# Patient Record
Sex: FEMALE | Race: WHITE | Employment: FULL TIME | URBAN - METROPOLITAN AREA
[De-identification: names, ages, dates, MRNs, and addresses within clinical notes are randomized per-mention and may not be internally consistent; named-entity substitution may affect disease eponyms.]

---

## 2018-04-03 ENCOUNTER — OFFICE VISIT (OUTPATIENT)
Dept: FAMILY MEDICINE CLINIC | Age: 28
End: 2018-04-03
Payer: COMMERCIAL

## 2018-04-03 VITALS
BODY MASS INDEX: 22.71 KG/M2 | WEIGHT: 133 LBS | RESPIRATION RATE: 12 BRPM | SYSTOLIC BLOOD PRESSURE: 90 MMHG | TEMPERATURE: 98.8 F | DIASTOLIC BLOOD PRESSURE: 58 MMHG | OXYGEN SATURATION: 99 % | HEIGHT: 64 IN | HEART RATE: 53 BPM

## 2018-04-03 DIAGNOSIS — J06.9 ACUTE URI: Primary | ICD-10-CM

## 2018-04-03 LAB
INFLUENZA A ANTIBODY: NEGATIVE
INFLUENZA B ANTIBODY: NEGATIVE
S PYO AG THROAT QL: NORMAL

## 2018-04-03 PROCEDURE — 99213 OFFICE O/P EST LOW 20 MIN: CPT | Performed by: PHYSICIAN ASSISTANT

## 2018-04-03 PROCEDURE — 87804 INFLUENZA ASSAY W/OPTIC: CPT | Performed by: PHYSICIAN ASSISTANT

## 2018-04-03 PROCEDURE — 87880 STREP A ASSAY W/OPTIC: CPT | Performed by: PHYSICIAN ASSISTANT

## 2018-04-03 RX ORDER — IBUPROFEN 800 MG/1
800 TABLET ORAL EVERY 6 HOURS PRN
Qty: 20 TABLET | Refills: 0 | Status: SHIPPED | OUTPATIENT
Start: 2018-04-03 | End: 2019-06-12 | Stop reason: ALTCHOICE

## 2018-04-03 RX ORDER — BROMPHENIRAMINE MALEATE, PSEUDOEPHEDRINE HYDROCHLORIDE, AND DEXTROMETHORPHAN HYDROBROMIDE 2; 30; 10 MG/5ML; MG/5ML; MG/5ML
5 SYRUP ORAL 4 TIMES DAILY PRN
Qty: 100 ML | Refills: 0 | Status: SHIPPED | OUTPATIENT
Start: 2018-04-03 | End: 2019-06-12 | Stop reason: ALTCHOICE

## 2018-04-03 RX ORDER — AMOXICILLIN AND CLAVULANATE POTASSIUM 875; 125 MG/1; MG/1
1 TABLET, FILM COATED ORAL 2 TIMES DAILY
Qty: 20 TABLET | Refills: 0 | Status: SHIPPED | OUTPATIENT
Start: 2018-04-03 | End: 2018-04-13

## 2018-04-23 ENCOUNTER — EMPLOYEE WELLNESS (OUTPATIENT)
Dept: INTERNAL MEDICINE CLINIC | Age: 28
End: 2018-04-23

## 2018-04-23 LAB
CHOLESTEROL, TOTAL: 145 MG/DL (ref 0–199)
GLUCOSE BLD-MCNC: 83 MG/DL (ref 74–107)
HDLC SERPL-MCNC: 64 MG/DL
LDL CHOLESTEROL CALCULATED: 69 MG/DL (ref 0–99)
TRIGL SERPL-MCNC: 60 MG/DL (ref 0–149)

## 2018-05-02 VITALS — BODY MASS INDEX: 21.97 KG/M2 | WEIGHT: 128 LBS

## 2018-05-21 ENCOUNTER — CLINICAL DOCUMENTATION (OUTPATIENT)
Dept: FAMILY MEDICINE CLINIC | Age: 28
End: 2018-05-21

## 2018-05-21 ENCOUNTER — NURSE TRIAGE (OUTPATIENT)
Dept: OTHER | Facility: CLINIC | Age: 28
End: 2018-05-21

## 2018-09-13 ENCOUNTER — HOSPITAL ENCOUNTER (OUTPATIENT)
Age: 28
Discharge: HOME | End: 2018-09-15
Payer: COMMERCIAL

## 2018-09-13 PROCEDURE — 87210 SMEAR WET MOUNT SALINE/INK: CPT

## 2018-09-13 PROCEDURE — 87591 N.GONORRHOEAE DNA AMP PROB: CPT

## 2018-09-13 PROCEDURE — 87147 CULTURE TYPE IMMUNOLOGIC: CPT

## 2018-09-13 PROCEDURE — 87491 CHLMYD TRACH DNA AMP PROBE: CPT

## 2018-09-13 PROCEDURE — 87070 CULTURE OTHR SPECIMN AEROBIC: CPT

## 2018-09-17 LAB
GENITAL CULTURE, ROUTINE: ABNORMAL
GENITAL CULTURE, ROUTINE: ABNORMAL
ORGANISM: ABNORMAL

## 2018-09-18 LAB
CHLAMYDIA TRACHOMATIS AMPLIFIED DET: NORMAL
CULTURE, TRICHOMONAS: NORMAL
N GONORRHOEAE AMPLIFIED DET: NORMAL

## 2019-01-25 ENCOUNTER — HOSPITAL ENCOUNTER (OUTPATIENT)
Age: 29
Discharge: HOME | End: 2019-01-25
Payer: COMMERCIAL

## 2019-01-25 LAB
ALBUMIN SERPL-MCNC: 4.4 G/DL (ref 3.5–5.2)
ALP BLD-CCNC: 41 U/L (ref 35–104)
ALT SERPL-CCNC: 11 U/L (ref 0–32)
ANION GAP SERPL CALCULATED.3IONS-SCNC: 8 MMOL/L (ref 7–16)
AST SERPL-CCNC: 15 U/L (ref 0–31)
BILIRUB SERPL-MCNC: 0.3 MG/DL (ref 0–1.2)
BUN BLDV-MCNC: 14 MG/DL (ref 6–20)
CALCIUM SERPL-MCNC: 9.1 MG/DL (ref 8.6–10.2)
CHLORIDE BLD-SCNC: 104 MMOL/L (ref 98–107)
CHOLESTEROL, TOTAL: 136 MG/DL (ref 0–199)
CO2: 27 MMOL/L (ref 22–29)
CREAT SERPL-MCNC: 0.9 MG/DL (ref 0.5–1)
FERRITIN: 7 NG/ML
FOLATE: 7.9 NG/ML (ref 4.8–24.2)
GFR AFRICAN AMERICAN: >60
GFR NON-AFRICAN AMERICAN: >60 ML/MIN/1.73
GLUCOSE BLD-MCNC: 84 MG/DL (ref 74–99)
HCT VFR BLD CALC: 40.4 % (ref 34–48)
HDLC SERPL-MCNC: 65 MG/DL
HEMOGLOBIN: 12.9 G/DL (ref 11.5–15.5)
IRON SATURATION: 20 % (ref 15–50)
IRON: 65 MCG/DL (ref 37–145)
LDL CHOLESTEROL CALCULATED: 59 MG/DL (ref 0–99)
MCH RBC QN AUTO: 25.5 PG (ref 26–35)
MCHC RBC AUTO-ENTMCNC: 31.9 % (ref 32–34.5)
MCV RBC AUTO: 79.8 FL (ref 80–99.9)
PDW BLD-RTO: 14 FL (ref 11.5–15)
PLATELET # BLD: 255 E9/L (ref 130–450)
PMV BLD AUTO: 9.9 FL (ref 7–12)
POTASSIUM SERPL-SCNC: 3.7 MMOL/L (ref 3.5–5)
RBC # BLD: 5.06 E12/L (ref 3.5–5.5)
SODIUM BLD-SCNC: 139 MMOL/L (ref 132–146)
TOTAL IRON BINDING CAPACITY: 319 MCG/DL (ref 250–450)
TOTAL PROTEIN: 6.9 G/DL (ref 6.4–8.3)
TRIGL SERPL-MCNC: 58 MG/DL (ref 0–149)
TSH SERPL DL<=0.05 MIU/L-ACNC: 1.69 UIU/ML (ref 0.27–4.2)
VITAMIN B-12: 705 PG/ML (ref 211–946)
VITAMIN D 25-HYDROXY: 27 NG/ML (ref 30–100)
VLDLC SERPL CALC-MCNC: 12 MG/DL
WBC # BLD: 5.4 E9/L (ref 4.5–11.5)

## 2019-01-25 PROCEDURE — 82306 VITAMIN D 25 HYDROXY: CPT

## 2019-01-25 PROCEDURE — 83540 ASSAY OF IRON: CPT

## 2019-01-25 PROCEDURE — 36415 COLL VENOUS BLD VENIPUNCTURE: CPT

## 2019-01-25 PROCEDURE — 82607 VITAMIN B-12: CPT

## 2019-01-25 PROCEDURE — 80053 COMPREHEN METABOLIC PANEL: CPT

## 2019-01-25 PROCEDURE — 82728 ASSAY OF FERRITIN: CPT

## 2019-01-25 PROCEDURE — 83550 IRON BINDING TEST: CPT

## 2019-01-25 PROCEDURE — 84443 ASSAY THYROID STIM HORMONE: CPT

## 2019-01-25 PROCEDURE — 82746 ASSAY OF FOLIC ACID SERUM: CPT

## 2019-01-25 PROCEDURE — 80061 LIPID PANEL: CPT

## 2019-01-25 PROCEDURE — 85027 COMPLETE CBC AUTOMATED: CPT

## 2019-06-12 ENCOUNTER — HOSPITAL ENCOUNTER (OUTPATIENT)
Age: 29
Discharge: HOME | End: 2019-06-14
Payer: COMMERCIAL

## 2019-06-12 PROCEDURE — 88175 CYTOPATH C/V AUTO FLUID REDO: CPT

## 2019-06-12 PROCEDURE — 87491 CHLMYD TRACH DNA AMP PROBE: CPT

## 2019-06-12 PROCEDURE — 87591 N.GONORRHOEAE DNA AMP PROB: CPT

## 2019-06-12 PROCEDURE — 87624 HPV HI-RISK TYP POOLED RSLT: CPT

## 2019-06-17 LAB
CHLAMYDIA BY THIN PREP: NEGATIVE
N. GONORRHOEAE DNA, THIN PREP: NEGATIVE
SOURCE: NORMAL

## 2019-06-18 LAB
HPV SAMPLE: NORMAL
HPV TYPE 16: NOT DETECTED
HPV TYPE 18: NOT DETECTED
HPV, HIGH RISK OTHER: NOT DETECTED
INTERPRETATION: NORMAL
SOURCE: NORMAL

## 2019-10-11 ENCOUNTER — HOSPITAL ENCOUNTER (OUTPATIENT)
Age: 29
Discharge: HOME | End: 2019-10-13
Payer: COMMERCIAL

## 2019-10-11 DIAGNOSIS — N89.8 VAGINAL ODOR: ICD-10-CM

## 2019-10-11 DIAGNOSIS — N89.8 VAGINAL DISCHARGE: ICD-10-CM

## 2019-10-11 PROCEDURE — 87591 N.GONORRHOEAE DNA AMP PROB: CPT

## 2019-10-11 PROCEDURE — 87070 CULTURE OTHR SPECIMN AEROBIC: CPT

## 2019-10-11 PROCEDURE — 87147 CULTURE TYPE IMMUNOLOGIC: CPT

## 2019-10-11 PROCEDURE — 87491 CHLMYD TRACH DNA AMP PROBE: CPT

## 2019-10-14 LAB
GENITAL CULTURE, ROUTINE: ABNORMAL
GENITAL CULTURE, ROUTINE: ABNORMAL
ORGANISM: ABNORMAL
ORGANISM: ABNORMAL

## 2019-10-16 LAB
C TRACH DNA GENITAL QL NAA+PROBE: NEGATIVE
N. GONORRHOEAE DNA: NEGATIVE
SOURCE: NORMAL

## 2020-05-20 ENCOUNTER — PREP FOR PROCEDURE (OUTPATIENT)
Dept: SURGERY | Age: 30
End: 2020-05-20

## 2020-05-20 RX ORDER — SODIUM CHLORIDE, SODIUM LACTATE, POTASSIUM CHLORIDE, CALCIUM CHLORIDE 600; 310; 30; 20 MG/100ML; MG/100ML; MG/100ML; MG/100ML
INJECTION, SOLUTION INTRAVENOUS CONTINUOUS
Status: CANCELLED | OUTPATIENT
Start: 2020-05-20

## 2020-05-20 RX ORDER — SODIUM CHLORIDE 0.9 % (FLUSH) 0.9 %
10 SYRINGE (ML) INJECTION EVERY 12 HOURS SCHEDULED
Status: CANCELLED | OUTPATIENT
Start: 2020-05-20

## 2020-05-20 NOTE — H&P (VIEW-ONLY)
Name: Jorge Weiner            : 1990 Sex: F  Age: 31 yrs  Acct#:  [id number]           Patient was referred by Debi Murphy MD.  Patient's primary care provider is Debi Murphy MD.  CC: Patient presents for follow up of ongoing rectal pain    HPI: Follow-up. Meds Prior to Visit:  Synthroid     Prozac     Wellbutrin SR     Protonix     Multivitamin        Allergies:  NKDA        Wt Prior: 142lb as of 17    Exam:  Const: Appears healthy and well developed. No signs of apparent distress present. Head/Face: Atraumatic, normocephalic on inspection. Eyes: Conjunctivae pink. Pupils equal, round and reactive to light. Sclerae clear and anicteric. ENMT: External ears WNL. External nose WNL. Lips: Appear normal and healthy. Neck: Normal to inspection. Normal to palpation. No masses appreciated. Trachea midline. Thyroid is normal in size. No jugular venous distention. Resp: Respiration rate is normal. No wheezing. Clear to auscultation bilaterally. No rales or rhonchi appreciated over the lungs bilaterally. CV: Rate is regular. Rhythm is regular. S1 is normal. S2 is normal. No heart murmur appreciated. Extremities: No clubbing or cyanosis. No edema of the lower limbs bilaterally. Abdomen: No visible herniations. No abdominal scars. Positive bowel sounds in all quadrants. Abdomen is soft, nontender, and nondistended without guarding, rigidity or rebound tenderness. No palpable abdominal aneurysms present. No palpable hepatosplenomegaly. Lymph: No palpable lymphadenopathy in the cervical, supraclavicular, axillary and inguinal region(s). Musculo: Walks with a normal gait. Upper Extremities: Normal to inspection. ROM: Full ROM bilaterally. Lower Extremities: Normal to inspection. ROM: Full ROM bilaterally. Skin: Skin warm and dry with no evidence of unusual rashes or suspicious lesions. Neuro: Alert and oriented x3. Mood is normal.  Cranial Nerves: Cranial nerves II-XII grossly intact.   Psych:

## 2020-05-27 ENCOUNTER — HOSPITAL ENCOUNTER (OUTPATIENT)
Age: 30
Discharge: HOME | End: 2020-05-29
Payer: COMMERCIAL

## 2020-05-27 PROCEDURE — U0003 INFECTIOUS AGENT DETECTION BY NUCLEIC ACID (DNA OR RNA); SEVERE ACUTE RESPIRATORY SYNDROME CORONAVIRUS 2 (SARS-COV-2) (CORONAVIRUS DISEASE [COVID-19]), AMPLIFIED PROBE TECHNIQUE, MAKING USE OF HIGH THROUGHPUT TECHNOLOGIES AS DESCRIBED BY CMS-2020-01-R: HCPCS

## 2020-05-28 LAB
SARS-COV-2: NOT DETECTED
SOURCE: NORMAL

## 2020-05-29 RX ORDER — ZOLPIDEM TARTRATE 10 MG/1
5 TABLET ORAL NIGHTLY PRN
COMMUNITY

## 2020-05-29 RX ORDER — BUPROPION HYDROCHLORIDE 150 MG/1
150 TABLET ORAL EVERY MORNING
COMMUNITY
End: 2021-04-19

## 2020-06-01 ENCOUNTER — ANESTHESIA (OUTPATIENT)
Dept: OPERATING ROOM | Age: 30
End: 2020-06-01
Payer: COMMERCIAL

## 2020-06-01 ENCOUNTER — ANESTHESIA EVENT (OUTPATIENT)
Dept: OPERATING ROOM | Age: 30
End: 2020-06-01
Payer: COMMERCIAL

## 2020-06-01 ENCOUNTER — HOSPITAL ENCOUNTER (OUTPATIENT)
Age: 30
Setting detail: OUTPATIENT SURGERY
Discharge: HOME | End: 2020-06-01
Attending: SURGERY | Admitting: SURGERY
Payer: COMMERCIAL

## 2020-06-01 VITALS
BODY MASS INDEX: 24.75 KG/M2 | HEART RATE: 83 BPM | OXYGEN SATURATION: 99 % | TEMPERATURE: 98.9 F | WEIGHT: 145 LBS | DIASTOLIC BLOOD PRESSURE: 53 MMHG | SYSTOLIC BLOOD PRESSURE: 97 MMHG | RESPIRATION RATE: 18 BRPM | HEIGHT: 64 IN

## 2020-06-01 VITALS
DIASTOLIC BLOOD PRESSURE: 56 MMHG | SYSTOLIC BLOOD PRESSURE: 88 MMHG | RESPIRATION RATE: 22 BRPM | OXYGEN SATURATION: 100 %

## 2020-06-01 LAB — HCG(URINE) PREGNANCY TEST: NEGATIVE

## 2020-06-01 PROCEDURE — 3600000012 HC SURGERY LEVEL 2 ADDTL 15MIN: Performed by: SURGERY

## 2020-06-01 PROCEDURE — 2709999900 HC NON-CHARGEABLE SUPPLY: Performed by: SURGERY

## 2020-06-01 PROCEDURE — 3700000000 HC ANESTHESIA ATTENDED CARE: Performed by: SURGERY

## 2020-06-01 PROCEDURE — 3700000001 HC ADD 15 MINUTES (ANESTHESIA): Performed by: SURGERY

## 2020-06-01 PROCEDURE — 6360000002 HC RX W HCPCS

## 2020-06-01 PROCEDURE — 2500000003 HC RX 250 WO HCPCS

## 2020-06-01 PROCEDURE — 7100000011 HC PHASE II RECOVERY - ADDTL 15 MIN: Performed by: SURGERY

## 2020-06-01 PROCEDURE — 6360000002 HC RX W HCPCS: Performed by: SURGERY

## 2020-06-01 PROCEDURE — 81025 URINE PREGNANCY TEST: CPT

## 2020-06-01 PROCEDURE — 7100000010 HC PHASE II RECOVERY - FIRST 15 MIN: Performed by: SURGERY

## 2020-06-01 PROCEDURE — 3600000002 HC SURGERY LEVEL 2 BASE: Performed by: SURGERY

## 2020-06-01 RX ORDER — MIDAZOLAM HYDROCHLORIDE 1 MG/ML
INJECTION INTRAMUSCULAR; INTRAVENOUS PRN
Status: DISCONTINUED | OUTPATIENT
Start: 2020-06-01 | End: 2020-06-01 | Stop reason: SDUPTHER

## 2020-06-01 RX ORDER — SODIUM CHLORIDE, SODIUM LACTATE, POTASSIUM CHLORIDE, CALCIUM CHLORIDE 600; 310; 30; 20 MG/100ML; MG/100ML; MG/100ML; MG/100ML
INJECTION, SOLUTION INTRAVENOUS CONTINUOUS
Status: DISCONTINUED | OUTPATIENT
Start: 2020-06-01 | End: 2020-06-01 | Stop reason: HOSPADM

## 2020-06-01 RX ORDER — PROPOFOL 10 MG/ML
INJECTION, EMULSION INTRAVENOUS CONTINUOUS PRN
Status: DISCONTINUED | OUTPATIENT
Start: 2020-06-01 | End: 2020-06-01 | Stop reason: SDUPTHER

## 2020-06-01 RX ORDER — KETOROLAC TROMETHAMINE 30 MG/ML
INJECTION, SOLUTION INTRAMUSCULAR; INTRAVENOUS PRN
Status: DISCONTINUED | OUTPATIENT
Start: 2020-06-01 | End: 2020-06-01 | Stop reason: SDUPTHER

## 2020-06-01 RX ORDER — CEFAZOLIN SODIUM 2 G/50ML
2 SOLUTION INTRAVENOUS
Status: DISCONTINUED | OUTPATIENT
Start: 2020-06-01 | End: 2020-06-01 | Stop reason: HOSPADM

## 2020-06-01 RX ORDER — SODIUM CHLORIDE 0.9 % (FLUSH) 0.9 %
10 SYRINGE (ML) INJECTION EVERY 12 HOURS SCHEDULED
Status: DISCONTINUED | OUTPATIENT
Start: 2020-06-01 | End: 2020-06-01 | Stop reason: HOSPADM

## 2020-06-01 RX ORDER — ONDANSETRON 2 MG/ML
INJECTION INTRAMUSCULAR; INTRAVENOUS PRN
Status: DISCONTINUED | OUTPATIENT
Start: 2020-06-01 | End: 2020-06-01 | Stop reason: SDUPTHER

## 2020-06-01 RX ORDER — LIDOCAINE HYDROCHLORIDE 10 MG/ML
INJECTION, SOLUTION EPIDURAL; INFILTRATION; INTRACAUDAL; PERINEURAL PRN
Status: DISCONTINUED | OUTPATIENT
Start: 2020-06-01 | End: 2020-06-01 | Stop reason: SDUPTHER

## 2020-06-01 RX ORDER — MECLIZINE HCL 12.5 MG/1
12.5 TABLET ORAL 3 TIMES DAILY PRN
COMMUNITY
End: 2021-04-19

## 2020-06-01 RX ORDER — FENTANYL CITRATE 50 UG/ML
25 INJECTION, SOLUTION INTRAMUSCULAR; INTRAVENOUS EVERY 5 MIN PRN
Status: DISCONTINUED | OUTPATIENT
Start: 2020-06-01 | End: 2020-06-01 | Stop reason: HOSPADM

## 2020-06-01 RX ORDER — DEXAMETHASONE SODIUM PHOSPHATE 4 MG/ML
INJECTION, SOLUTION INTRA-ARTICULAR; INTRALESIONAL; INTRAMUSCULAR; INTRAVENOUS; SOFT TISSUE PRN
Status: DISCONTINUED | OUTPATIENT
Start: 2020-06-01 | End: 2020-06-01 | Stop reason: SDUPTHER

## 2020-06-01 RX ORDER — PROPOFOL 10 MG/ML
INJECTION, EMULSION INTRAVENOUS PRN
Status: DISCONTINUED | OUTPATIENT
Start: 2020-06-01 | End: 2020-06-01 | Stop reason: SDUPTHER

## 2020-06-01 RX ORDER — FENTANYL CITRATE 50 UG/ML
INJECTION, SOLUTION INTRAMUSCULAR; INTRAVENOUS PRN
Status: DISCONTINUED | OUTPATIENT
Start: 2020-06-01 | End: 2020-06-01 | Stop reason: SDUPTHER

## 2020-06-01 RX ADMIN — DEXAMETHASONE SODIUM PHOSPHATE 10 MG: 4 INJECTION, SOLUTION INTRAMUSCULAR; INTRAVENOUS at 12:51

## 2020-06-01 RX ADMIN — LIDOCAINE HYDROCHLORIDE 20 MG: 10 INJECTION, SOLUTION EPIDURAL; INFILTRATION; INTRACAUDAL; PERINEURAL at 12:48

## 2020-06-01 RX ADMIN — ONDANSETRON HYDROCHLORIDE 4 MG: 2 INJECTION, SOLUTION INTRAMUSCULAR; INTRAVENOUS at 12:50

## 2020-06-01 RX ADMIN — KETOROLAC TROMETHAMINE 30 MG: 30 INJECTION, SOLUTION INTRAMUSCULAR; INTRAVENOUS at 12:58

## 2020-06-01 RX ADMIN — FENTANYL CITRATE 25 MCG: 50 INJECTION, SOLUTION INTRAMUSCULAR; INTRAVENOUS at 12:51

## 2020-06-01 RX ADMIN — PROPOFOL 70 MG: 10 INJECTION, EMULSION INTRAVENOUS at 12:48

## 2020-06-01 RX ADMIN — PROPOFOL 60 MCG/KG/MIN: 10 INJECTION, EMULSION INTRAVENOUS at 12:51

## 2020-06-01 RX ADMIN — FENTANYL CITRATE 50 MCG: 50 INJECTION, SOLUTION INTRAMUSCULAR; INTRAVENOUS at 12:48

## 2020-06-01 RX ADMIN — FENTANYL CITRATE 25 MCG: 50 INJECTION, SOLUTION INTRAMUSCULAR; INTRAVENOUS at 12:57

## 2020-06-01 RX ADMIN — MIDAZOLAM 2 MG: 1 INJECTION INTRAMUSCULAR; INTRAVENOUS at 12:45

## 2020-06-01 ASSESSMENT — PULMONARY FUNCTION TESTS
PIF_VALUE: 1
PIF_VALUE: 0
PIF_VALUE: 1

## 2020-06-01 ASSESSMENT — LIFESTYLE VARIABLES: SMOKING_STATUS: 1

## 2020-06-01 NOTE — PROGRESS NOTES
Discharge instructions given to patient and family. Questions answered and verbalizes understanding.
Have you been tested for COVID  Yes           Have you been told you were positive for COVID No  Have you had any known exposure to someone that is positive for COVID Yes  March @ [facility] you have a cough                   No              Do you have shortness of breath No                 Do you have a sore throat            No                Are you having chills                    No                Are you having muscle aches. No                    Please come to the hospital wearing a mask and have your significant other wear a mask as well. Both of you should check your temperature before leaving to come here,  if it is 100 or higher please call [phone] for instruction.
surgery we would greatly appreciate your comments    [] Parent/guardian of a minor must accompany their child and remain on the premises  the entire time they are under our care     [] Pediatric patients may bring favorite toy, blanket or comfort item with them    [] A caregiver or family member must remain with the patient during their stay if they are mentally handicapped, have dementia, disoriented or unable to use a call light or would be a safety concern if left unattended    [x] Please notify surgeon if you develop any illness between now and time of surgery (cold, cough, sore throat, fever, nausea, vomiting) or any signs of infections  including skin, wounds, and dental.    [] Other instructions    EDUCATIONAL MATERIALS PROVIDED:    [] PAT Preoperative Education Packet/Booklet     [] Medication List    [] Fluoroscopy Information Pamphlet    [] Transfusion bracelet applied with instructions    [] Joint replacement video reviewed    [] Shower with antibacterial soap and use CHG wipes provided the evening before surgery as instructed

## 2020-06-01 NOTE — OP NOTE
Operative Note      Patient: Willy Jon  YOB: 1990  MRN: [id number]    Date of Procedure: 6/1/2020    Pre-Op Diagnosis: ACUTE ANAL FISSURE    Post-Op Diagnosis: Same       Procedure(s):  RECTAL EXAM UNDER ANESTHESIA WITH BOTOX INJECTIN    Surgeon(s):  Tutu Araujo MD    Assistant:   Resident: Rosario Arce DO    Anesthesia: Monitor Anesthesia Care    Estimated Blood Loss (mL): Minimal    Complications: None    Specimens:   * No specimens in log *    Implants:  * No implants in log *      Drains: * No LDAs found *    Findings: posterior anal fissure    Detailed Description of Procedure: The patient was brought to the operating room and positioned prone on the OR table. Sequential compression devices were placed on the patient's lower extremities and functioning. Preoperative antibiotics were administered. Anesthesia was obtained without complication as per the anesthesia record. Immediately prior to the procedure a time-out was called and the surgical checklist was reviewed and agreed upon by all present. The patient was prepped and draped in the usual sterile fashion. A digital rectal exam was performed without masses. The base of the anal fissure was cauterized. Dilute botulinum toxin 10 units/cc were injected in four quadrants as well as superior and inferior around the anus- 2cc per injection for a total of 100 units injected. The skin was washed and dried. Needle, sponge, and instrument counts were reported as correct x2. Dr. Grady Ha  was present and scrubbed throughout the case. The patient tolerated the procedure well without complications. She was transferred to the recovery area in good condition.     Electronically signed by Fabio Ponce DO on 6/1/2020 at 2:02 PM

## 2020-06-01 NOTE — ANESTHESIA PRE PROCEDURE
Department of Anesthesiology  Preprocedure Note       Name:  Yann Duron   Age:  31 y.o.  :  1990                                          MRN:  [id number]         Date:  2020      Surgeon: Robert Shah):  Toro Medrano MD    Procedure: Procedure(s):  RECTAL EXAM UNDER ANESTHESIA WITH BOTOX INJECTIN    Medications prior to admission:   Prior to Admission medications    Medication Sig Start Date End Date Taking? Authorizing Provider   meclizine (ANTIVERT) 12.5 MG tablet Take 12.5 mg by mouth 3 times daily as needed   Yes Historical Provider, MD   zolpidem (AMBIEN) 10 MG tablet Take 5 mg by mouth nightly as needed for Sleep.    Yes Historical Provider, MD   buPROPion (WELLBUTRIN XL) 150 MG extended release tablet Take 150 mg by mouth every morning   Yes Historical Provider, MD   FLUoxetine (PROZAC) 20 MG capsule Take 20 mg by mouth daily   Yes Historical Provider, MD   pantoprazole (PROTONIX) 40 MG tablet Take 40 mg by mouth as needed  17  Yes Historical Provider, MD   Multiple Vitamins-Minerals (THERAPEUTIC MULTIVITAMIN-MINERALS) tablet Take 1 tablet by mouth daily   Yes Historical Provider, MD   levothyroxine (SYNTHROID) 75 MCG tablet Take 75 mcg by mouth Daily   Yes Historical Provider, MD       Current medications:    Current Facility-Administered Medications   Medication Dose Route Frequency Provider Last Rate Last Dose    lactated ringers infusion   Intravenous Continuous Toro Medrano MD        sodium chloride flush 0.9 % injection 10 mL  10 mL Intravenous 2 times per day Toro Medrano MD        ceFAZolin (ANCEF) 2 g in dextrose 3 % 50 mL IVPB (duplex)  2 g Intravenous On Call to 520 Kasey Barry MD           Allergies:  No Known Allergies    Problem List:    Patient Active Problem List   Diagnosis Code    Hyperemesis affecting pregnancy, antepartum O21.0    High-risk pregnancy O09.90    Suspected shortening of cervix not found Z03.75    Encounter for fetal anatomic survey Z36.89  Fetal growth problem suspected but not found Z03.74       Past Medical History:        Diagnosis Date    PONV (postoperative nausea and vomiting)     Rectal fissure 01/2020    Thyroid disease        Past Surgical History:        Procedure Laterality Date    COLONOSCOPY  12/2019    GASTRIC RESTRICTION SURGERY  10/2016    GASTRIC SLEEVE    UPPER GASTROINTESTINAL ENDOSCOPY  08/31/2017    WISDOM TOOTH EXTRACTION  Age 24       Social History:    Social History     Tobacco Use    Smoking status: Current Some Day Smoker    Smokeless tobacco: Never Used    Tobacco comment: juan ramon ecdeno   Substance Use Topics    Alcohol use: No                                Ready to quit: Not Answered  Counseling given: Not Answered  Comment: juan ramon cedeno      Vital Signs (Current):   Vitals:    05/29/20 1026 06/01/20 1100   BP:  (!) 106/56   Pulse:  63   Temp:  97.7 °F (36.5 °C)   TempSrc:  Temporal   SpO2:  99%   Weight: 145 lb (65.8 kg)    Height: 5' 4\" (1.626 m)                                               BP Readings from Last 3 Encounters:   06/01/20 (!) 106/56   10/11/19 103/67   07/23/19 113/78       NPO Status: Time of last liquid consumption: 2100                        Time of last solid consumption: 2100                        Date of last liquid consumption: 05/31/20                        Date of last solid food consumption: 05/31/20    BMI:   Wt Readings from Last 3 Encounters:   05/29/20 145 lb (65.8 kg)   10/11/19 139 lb (63 kg)   07/23/19 132 lb 12.8 oz (60.2 kg)     Body mass index is 24.89 kg/m².     CBC:   Lab Results   Component Value Date    WBC 5.4 01/25/2019    RBC 5.06 01/25/2019    HGB 12.9 01/25/2019    HCT 40.4 01/25/2019    MCV 79.8 01/25/2019    RDW 14.0 01/25/2019     01/25/2019       CMP:   Lab Results   Component Value Date     01/25/2019    K 3.7 01/25/2019     01/25/2019    CO2 27 01/25/2019    BUN 14 01/25/2019    CREATININE 0.9 01/25/2019    GFRAA >60 01/25/2019

## 2020-07-13 ENCOUNTER — EMPLOYEE WELLNESS (OUTPATIENT)
Dept: OTHER | Age: 30
End: 2020-07-13

## 2020-07-13 LAB
CHOLESTEROL, TOTAL: 149 MG/DL (ref 0–199)
GLUCOSE BLD-MCNC: 81 MG/DL (ref 74–107)
HDLC SERPL-MCNC: 63 MG/DL
LDL CHOLESTEROL CALCULATED: 72 MG/DL (ref 0–99)
TRIGL SERPL-MCNC: 72 MG/DL (ref 0–149)

## 2020-10-19 VITALS — BODY MASS INDEX: 25.06 KG/M2 | WEIGHT: 146 LBS

## 2020-11-02 ENCOUNTER — HOSPITAL ENCOUNTER (OUTPATIENT)
Age: 30
Discharge: HOME | End: 2020-11-02
Payer: COMMERCIAL

## 2020-11-02 LAB
ALBUMIN SERPL-MCNC: 4.2 G/DL (ref 3.5–5.2)
ALP BLD-CCNC: 51 U/L (ref 35–104)
ALT SERPL-CCNC: 9 U/L (ref 0–32)
ANION GAP SERPL CALCULATED.3IONS-SCNC: 6 MMOL/L (ref 7–16)
AST SERPL-CCNC: 14 U/L (ref 0–31)
BACTERIA: ABNORMAL /HPF
BASOPHILS ABSOLUTE: 0.04 E9/L (ref 0–0.2)
BASOPHILS RELATIVE PERCENT: 0.8 % (ref 0–2)
BILIRUB SERPL-MCNC: 0.3 MG/DL (ref 0–1.2)
BILIRUBIN URINE: NEGATIVE
BLOOD, URINE: NEGATIVE
BUN BLDV-MCNC: 10 MG/DL (ref 6–20)
CALCIUM SERPL-MCNC: 9.4 MG/DL (ref 8.6–10.2)
CHLORIDE BLD-SCNC: 106 MMOL/L (ref 98–107)
CHOLESTEROL, TOTAL: 161 MG/DL (ref 0–199)
CLARITY: CLEAR
CO2: 28 MMOL/L (ref 22–29)
COLOR: YELLOW
CREAT SERPL-MCNC: 0.9 MG/DL (ref 0.5–1)
EOSINOPHILS ABSOLUTE: 0.22 E9/L (ref 0.05–0.5)
EOSINOPHILS RELATIVE PERCENT: 4.3 % (ref 0–6)
EPITHELIAL CELLS, UA: ABNORMAL /HPF
GFR AFRICAN AMERICAN: >60
GFR NON-AFRICAN AMERICAN: >60 ML/MIN/1.73
GLUCOSE BLD-MCNC: 82 MG/DL (ref 74–99)
GLUCOSE URINE: NEGATIVE MG/DL
HCT VFR BLD CALC: 39.2 % (ref 34–48)
HDLC SERPL-MCNC: 68 MG/DL
HEMOGLOBIN: 11.8 G/DL (ref 11.5–15.5)
IMMATURE GRANULOCYTES #: 0.01 E9/L
IMMATURE GRANULOCYTES %: 0.2 % (ref 0–5)
KETONES, URINE: NEGATIVE MG/DL
LDL CHOLESTEROL CALCULATED: 75 MG/DL (ref 0–99)
LEUKOCYTE ESTERASE, URINE: ABNORMAL
LYMPHOCYTES ABSOLUTE: 1.38 E9/L (ref 1.5–4)
LYMPHOCYTES RELATIVE PERCENT: 27.3 % (ref 20–42)
MCH RBC QN AUTO: 24.5 PG (ref 26–35)
MCHC RBC AUTO-ENTMCNC: 30.1 % (ref 32–34.5)
MCV RBC AUTO: 81.3 FL (ref 80–99.9)
MONOCYTES ABSOLUTE: 0.23 E9/L (ref 0.1–0.95)
MONOCYTES RELATIVE PERCENT: 4.5 % (ref 2–12)
MUCUS: PRESENT /LPF
NEUTROPHILS ABSOLUTE: 3.18 E9/L (ref 1.8–7.3)
NEUTROPHILS RELATIVE PERCENT: 62.9 % (ref 43–80)
NITRITE, URINE: NEGATIVE
PDW BLD-RTO: 13.8 FL (ref 11.5–15)
PH UA: 5.5 (ref 5–9)
PLATELET # BLD: 267 E9/L (ref 130–450)
PMV BLD AUTO: 10.7 FL (ref 7–12)
POTASSIUM SERPL-SCNC: 4.2 MMOL/L (ref 3.5–5)
PROTEIN UA: NEGATIVE MG/DL
RBC # BLD: 4.82 E12/L (ref 3.5–5.5)
RBC UA: ABNORMAL /HPF (ref 0–2)
SODIUM BLD-SCNC: 140 MMOL/L (ref 132–146)
SPECIFIC GRAVITY UA: >=1.03 (ref 1–1.03)
TOTAL PROTEIN: 6.9 G/DL (ref 6.4–8.3)
TRIGL SERPL-MCNC: 88 MG/DL (ref 0–149)
TSH SERPL DL<=0.05 MIU/L-ACNC: 2.86 UIU/ML (ref 0.27–4.2)
UROBILINOGEN, URINE: 0.2 E.U./DL
VITAMIN D 25-HYDROXY: 29 NG/ML (ref 30–100)
VLDLC SERPL CALC-MCNC: 18 MG/DL
WBC # BLD: 5.1 E9/L (ref 4.5–11.5)
WBC UA: ABNORMAL /HPF (ref 0–5)

## 2020-11-02 PROCEDURE — 36415 COLL VENOUS BLD VENIPUNCTURE: CPT

## 2020-11-02 PROCEDURE — 82306 VITAMIN D 25 HYDROXY: CPT

## 2020-11-02 PROCEDURE — 81001 URINALYSIS AUTO W/SCOPE: CPT

## 2020-11-02 PROCEDURE — 87088 URINE BACTERIA CULTURE: CPT

## 2020-11-02 PROCEDURE — 80053 COMPREHEN METABOLIC PANEL: CPT

## 2020-11-02 PROCEDURE — 84443 ASSAY THYROID STIM HORMONE: CPT

## 2020-11-02 PROCEDURE — 80061 LIPID PANEL: CPT

## 2020-11-02 PROCEDURE — 85025 COMPLETE CBC W/AUTO DIFF WBC: CPT

## 2020-11-04 LAB — URINE CULTURE, ROUTINE: NORMAL

## 2020-12-10 ENCOUNTER — OFFICE VISIT (OUTPATIENT)
Dept: NEUROLOGY | Age: 30
End: 2020-12-10
Payer: COMMERCIAL

## 2020-12-10 VITALS
DIASTOLIC BLOOD PRESSURE: 60 MMHG | BODY MASS INDEX: 24.92 KG/M2 | SYSTOLIC BLOOD PRESSURE: 90 MMHG | TEMPERATURE: 97.9 F | HEIGHT: 64 IN | WEIGHT: 146 LBS

## 2020-12-10 PROBLEM — M54.17 LUMBOSACRAL RADICULOPATHY: Chronic | Status: ACTIVE | Noted: 2020-12-10

## 2020-12-10 PROCEDURE — 95910 NRV CNDJ TEST 7-8 STUDIES: CPT | Performed by: PSYCHIATRY & NEUROLOGY

## 2020-12-10 PROCEDURE — 99201 PR OFFICE OUTPATIENT NEW 10 MINUTES: CPT | Performed by: PSYCHIATRY & NEUROLOGY

## 2020-12-10 PROCEDURE — 95886 MUSC TEST DONE W/N TEST COMP: CPT | Performed by: PSYCHIATRY & NEUROLOGY

## 2020-12-10 NOTE — PROGRESS NOTES
[facility]  Electrodiagnostic Laboratory  *Accredited by the [facility] with exemplary status  [address]  Phone: [phone]  Fax: [phone]    Referring Provider: Don Brody MD  Primary Care Physician: Warner Valdez MD  Patient Name: Chantell Crowder  Patient YOB: 1990  Gender: female  BMI: Body mass index is 25.06 kg/m². Blood pressure 90/60, temperature 97.9 °F (36.6 °C), height 5' 4\" (1.626 m), weight 146 lb (66.2 kg), not currently breastfeeding. 12/10/2020    Description of clinical problem: Referred for bilateral lower extremity electrodiagnostic study; disturbance of skin sensation. Patient reports 4 month history of dysesthesia type symptoms involving the lower extremities. History of chronic lower back pain without acute/subacute sciatica symptoms. Pain Yes   ; Numbness/tingling  Yes; Weakness  No       Brief physical exam:   Sensory deficit No; Weakness No; Atrophy  No; Reflex abnormality No  Focused neurologic exam performed of the bilateral lower extremity shows grossly intact 5/5 power in all muscle groups and normal motor tone. DTRs: 2+ and symmetric. No ankle clonus, plantar responses are flexor. Sensory modalities to light touch and sharp stick testing reported is grossly intact subjectively without focal sensory deficit. Vibratory sensation of the ankles is intact and symmetric. Musculoskeletal: Negative bilateral straight leg raising test.  No fasciculations or focal muscular atrophy noted. Study Limitations: None     Full Name: Chantell Crowder Gender: Female  MRN: [id number] YOB: 1990      Visit Date: 12/10/2020 09:51  Age: 31 Years 6 Months Old  Examining Physician: Dr. Eloy Ricci   Referring Physician: Dr. Ming Eid  Technician: Chi Saldivar   Height: 5 feet 4 inch  Weight: 146 lbs  Notes: other disturbance of skim sensation (R20.8)      Motor NCS      Nerve / Sites Latency Amplitude Amp. 1-2 Distance Lat Diff Velocity Temp.    ms mV % cm ms m/s °C   R Peroneal - EDB      Ankle 4.53 2.6 100 8   31.4      Pop fossa 12.24 2.0 76.9 38 7.71 49 31.4   L Peroneal - EDB      Ankle 4.90 5.2 100 8   31.4      Pop fossa 12.66 5.2 100 38 7.76 49 31.4   R Tibial - AH      Ankle 3.18 14.2 100 8   31.4      Pop fossa 12.34 13.6 95.7 41 9.17 45 31.4             Sensory NCS      Nerve / Sites Onset Lat Peak Lat PP Amp Amp. 1-2 Distance Velocity Temp.    ms ms µV % cm m/s °C   R Sural - Ankle (Calf)      Calf 2.66 3.49 12.3 100 14 53 30.4   R Superficial peroneal - Ankle      Lat leg 1.98 2.45 2.4 100 10 51 30.4   L Superficial peroneal - Ankle      Lat leg 1.82 2.45 13.4 100 10 55 31.4             F  Wave      Nerve F Lat M Lat F-M Lat    ms ms ms   R Peroneal- EDB 48.3     R Tibial- AH 46.9 1.9 44.9   L Peroneal- EDB 46.3 2.0 44.3       H Reflex      Nerve Lat Hmax    ms   R Tibial - Soleus 28.0   L Tibial - Soleus 27. 7       EMG         EMG Summary Table     Spontaneous MUAP Recruitment   Muscle IA Fib PSW Fasc H.F. Amp Dur. PPP Pattern   R. Tibialis anterior Normal None None None None Normal Normal None Normal   R. Gastroc (Medial head) Normal None None None None 1+ 1+ None Mild-Few   R. Flexor digitorum longus Normal None None None None Normal Normal None Normal   R. Extensor digitorum brevis Normal None None None None 1+ 1+ None Decr   R. Abductor hallucis Normal None None None None 1+ 1+ None Decr   R. Vastus lateralis Normal None None None None 1+ 1+ None Decr   L. Tibialis anterior Normal None None None None Normal Normal None Normal   L. Gastroc (Medial head) Normal None None None None 1+ 1+ None Decr   L. Flexor digitorum longus Normal None None None None Normal Normal None Normal   L. Extensor digitorum brevis Normal None None None None Normal Normal None Normal   L. Vastus lateralis Normal None None None None Normal Normal None Normal   R. Gluteus medius Normal None None None None Normal Normal None Normal   R.  Upper

## 2021-07-23 LAB
CHOLESTEROL, TOTAL: 150 MG/DL (ref 0–199)
GLUCOSE BLD-MCNC: 73 MG/DL (ref 74–107)
HDLC SERPL-MCNC: 60 MG/DL
LDL CHOLESTEROL CALCULATED: 79 MG/DL (ref 0–99)
TRIGL SERPL-MCNC: 57 MG/DL (ref 0–149)

## 2021-12-02 ENCOUNTER — HOSPITAL ENCOUNTER (OUTPATIENT)
Dept: INFUSION THERAPY | Age: 31
Setting detail: INFUSION SERIES
Discharge: HOME | End: 2021-12-02
Payer: COMMERCIAL

## 2021-12-02 VITALS
RESPIRATION RATE: 16 BRPM | WEIGHT: 145 LBS | HEART RATE: 57 BPM | BODY MASS INDEX: 24.13 KG/M2 | DIASTOLIC BLOOD PRESSURE: 64 MMHG | OXYGEN SATURATION: 99 % | TEMPERATURE: 98.7 F | SYSTOLIC BLOOD PRESSURE: 108 MMHG

## 2021-12-02 PROCEDURE — 2500000003 HC RX 250 WO HCPCS: Performed by: INTERNAL MEDICINE

## 2021-12-02 PROCEDURE — 2580000003 HC RX 258: Performed by: INTERNAL MEDICINE

## 2021-12-02 PROCEDURE — M0243 CASIRIVI AND IMDEVI INFUSION: HCPCS

## 2021-12-02 RX ORDER — DIPHENHYDRAMINE HYDROCHLORIDE 50 MG/ML
50 INJECTION INTRAMUSCULAR; INTRAVENOUS
Status: CANCELLED | OUTPATIENT
Start: 2021-12-02 | End: 2021-12-02

## 2021-12-02 RX ORDER — SODIUM CHLORIDE 9 MG/ML
100 INJECTION, SOLUTION INTRAVENOUS CONTINUOUS PRN
Status: DISCONTINUED | OUTPATIENT
Start: 2021-12-02 | End: 2021-12-03 | Stop reason: HOSPADM

## 2021-12-02 RX ORDER — METHYLPREDNISOLONE SODIUM SUCCINATE 125 MG/2ML
125 INJECTION, POWDER, LYOPHILIZED, FOR SOLUTION INTRAMUSCULAR; INTRAVENOUS
Status: CANCELLED | OUTPATIENT
Start: 2021-12-02 | End: 2021-12-02

## 2021-12-02 RX ORDER — SODIUM CHLORIDE 9 MG/ML
INJECTION, SOLUTION INTRAVENOUS CONTINUOUS PRN
Status: DISCONTINUED | OUTPATIENT
Start: 2021-12-02 | End: 2021-12-03 | Stop reason: HOSPADM

## 2021-12-02 RX ORDER — SODIUM CHLORIDE 0.9 % (FLUSH) 0.9 %
5-40 SYRINGE (ML) INJECTION 2 TIMES DAILY
Status: DISCONTINUED | OUTPATIENT
Start: 2021-12-02 | End: 2021-12-03 | Stop reason: HOSPADM

## 2021-12-02 RX ORDER — EPINEPHRINE 1 MG/ML
0.3 INJECTION, SOLUTION, CONCENTRATE INTRAVENOUS PRN
Status: CANCELLED | OUTPATIENT
Start: 2021-12-02

## 2021-12-02 RX ORDER — SODIUM CHLORIDE 9 MG/ML
INJECTION, SOLUTION INTRAVENOUS CONTINUOUS PRN
Status: CANCELLED | OUTPATIENT
Start: 2021-12-02 | End: 2021-12-03

## 2021-12-02 RX ORDER — SODIUM CHLORIDE 9 MG/ML
100 INJECTION, SOLUTION INTRAVENOUS CONTINUOUS PRN
Status: CANCELLED | OUTPATIENT
Start: 2021-12-02

## 2021-12-02 RX ADMIN — SODIUM CHLORIDE: 9 INJECTION, SOLUTION INTRAVENOUS at 15:16

## 2021-12-02 RX ADMIN — IMDEVIMAB: 1332 INJECTION, SOLUTION, CONCENTRATE INTRAVENOUS at 15:23

## 2021-12-02 RX ADMIN — Medication 10 ML: at 15:56

## 2022-03-23 ENCOUNTER — TELEPHONE (OUTPATIENT)
Dept: BARIATRICS/WEIGHT MGMT | Age: 32
End: 2022-03-23

## 2022-03-28 ENCOUNTER — TELEPHONE (OUTPATIENT)
Dept: BARIATRICS/WEIGHT MGMT | Age: 32
End: 2022-03-28

## 2022-03-28 NOTE — TELEPHONE ENCOUNTER
Returned call to PT about initial weight loss appt. Left message that physician to review and we will return call to her.

## 2022-04-26 ENCOUNTER — TELEPHONE (OUTPATIENT)
Dept: BARIATRICS/WEIGHT MGMT | Age: 32
End: 2022-04-26

## 2022-04-26 NOTE — TELEPHONE ENCOUNTER
Patient called to cancel appt. Son is sick . Asked if she would like to reschedule she advised she will call us when she has her calender. Patient to call back to reschedule.

## 2022-06-07 ENCOUNTER — PROCEDURE VISIT (OUTPATIENT)
Dept: AUDIOLOGY | Age: 32
End: 2022-06-07
Payer: COMMERCIAL

## 2022-06-07 ENCOUNTER — OFFICE VISIT (OUTPATIENT)
Dept: ENT CLINIC | Age: 32
End: 2022-06-07
Payer: COMMERCIAL

## 2022-06-07 VITALS
HEIGHT: 64 IN | SYSTOLIC BLOOD PRESSURE: 113 MMHG | HEART RATE: 74 BPM | BODY MASS INDEX: 25.1 KG/M2 | DIASTOLIC BLOOD PRESSURE: 77 MMHG | WEIGHT: 147 LBS

## 2022-06-07 DIAGNOSIS — H69.81 DYSFUNCTION OF RIGHT EUSTACHIAN TUBE: ICD-10-CM

## 2022-06-07 DIAGNOSIS — J30.9 ALLERGIC RHINITIS, UNSPECIFIED SEASONALITY, UNSPECIFIED TRIGGER: ICD-10-CM

## 2022-06-07 DIAGNOSIS — H81.12 BENIGN PAROXYSMAL POSITIONAL VERTIGO OF LEFT EAR: Primary | ICD-10-CM

## 2022-06-07 DIAGNOSIS — H93.8X3 PRESSURE SENSATION IN BOTH EARS: ICD-10-CM

## 2022-06-07 DIAGNOSIS — H69.82 DYSFUNCTION OF LEFT EUSTACHIAN TUBE: ICD-10-CM

## 2022-06-07 PROCEDURE — 99204 OFFICE O/P NEW MOD 45 MIN: CPT | Performed by: NURSE PRACTITIONER

## 2022-06-07 PROCEDURE — 92567 TYMPANOMETRY: CPT | Performed by: AUDIOLOGIST

## 2022-06-07 PROCEDURE — 95992 CANALITH REPOSITIONING PROC: CPT | Performed by: NURSE PRACTITIONER

## 2022-06-07 RX ORDER — AZELASTINE 1 MG/ML
1-2 SPRAY, METERED NASAL 2 TIMES DAILY PRN
Qty: 30 ML | Refills: 1 | Status: SHIPPED | OUTPATIENT
Start: 2022-06-07

## 2022-06-07 RX ORDER — ONDANSETRON 4 MG/1
4 TABLET, ORALLY DISINTEGRATING ORAL 3 TIMES DAILY PRN
Qty: 21 TABLET | Refills: 0 | Status: SHIPPED | OUTPATIENT
Start: 2022-06-07

## 2022-06-07 ASSESSMENT — ENCOUNTER SYMPTOMS
SINUS PRESSURE: 0
EYES NEGATIVE: 1
RHINORRHEA: 0
STRIDOR: 0
SHORTNESS OF BREATH: 0
SINUS PAIN: 0
RESPIRATORY NEGATIVE: 1

## 2022-06-07 NOTE — PROGRESS NOTES
This patient was referred for tympanometric testing by TEE Agrawal due to bilateral ear pressure, that is worse in the left ear. Tympanometry revealed negative middle ear peak pressure, right ear and normal middle ear peak pressure, left ear. Compliance was normal, bilaterally. Ipsilateral acoustic reflexes were present, bilaterally at 1000Hz. The results were reviewed with the patient. Recommendations for follow up will be made pending physician consult.     Brittney Alcala CCC/SARAH  Audiologist  [id number]  NPI#:  [id number]

## 2022-06-07 NOTE — PROGRESS NOTES
[facility]  Dr. Kiara Urrutia. Bertis Councilman. Ms.Ed        Patient Name:  Nando Rg  :  1990     CHIEF C/O:    Chief Complaint   Patient presents with   Nesha Stabs Other     NP eustachian tube dysfunction       HISTORY OBTAINED FROM:  patient    HISTORY OF PRESENT ILLNESS:       Issa Chavez is a 34y.o. year old female, here today for follow up of ear fullness. Symptoms for 6 months  Has been seen PCP, has tried flonase and other OTC allergy treatments  No change with oral steroids  Continued pressure in the left ear  Intermittent muffled hearing in the left ear  Ringing in the left ear, high frequency, non-pulsatile  Denies family hx of hearing loss  Denies persistent noise exposure  Denies hx of recurrent ear infections or previous ear surgeries  States pain radiates into left jaw at times  Sensation of water dripping out of ear  Denies current symptoms  Constant itching in both ears, uses steroid cream  Treated for eczema of the ears  Does admit to clenching/gridning teeth at night  Patient also complains of ongoing dizziness symptoms when laying down and head turned to the left. This has been discussed with her PCP in the past who suggested BPPV as a cause. She states symptoms last for several seconds and include room spinning vertigo with mild nausea.   She has not been treated for this in the past.        Past Medical History:   Diagnosis Date    Lumbosacral radiculopathy 12/10/2020    PONV (postoperative nausea and vomiting)     Rectal fissure 2020    Thyroid disease      Past Surgical History:   Procedure Laterality Date    COLONOSCOPY  2019    GASTRIC RESTRICTION SURGERY  10/2016    GASTRIC SLEEVE    RECTAL SURGERY N/A 2020    RECTAL EXAM UNDER ANESTHESIA WITH BOTOX INJECTIN performed by Monique Lino MD at [address] [employer]  2017    WISDOM TOOTH EXTRACTION  Age 23       Current Outpatient Medications:     azelastine (ASTELIN) 0.1 % nasal spray, 1-2 sprays by Nasal route 2 times daily as needed for Rhinitis Use in each nostril as directed, Disp: 30 mL, Rfl: 1    ondansetron (ZOFRAN-ODT) 4 MG disintegrating tablet, Take 1 tablet by mouth 3 times daily as needed for Nausea or Vomiting, Disp: 21 tablet, Rfl: 0    diclofenac (VOLTAREN) 50 MG EC tablet, Take 1 tablet by mouth 2 times daily (with meals), Disp: 60 tablet, Rfl: 0    doxycycline hyclate (VIBRAMYCIN) 100 MG capsule, TAKE 1 CAPSULE BY MOUTH TWICE DAILY WITH FOOD, Disp: , Rfl:     tretinoin (RETIN-A) 0.025 % cream, APPLY PEA SIZED AMOUNT TOPICALLY TO THE AFFECTED AREA AT BEDTIME, Disp: , Rfl:     pantoprazole (PROTONIX) 40 MG tablet, Take 40 mg by mouth as needed , Disp: , Rfl:     Multiple Vitamins-Minerals (THERAPEUTIC MULTIVITAMIN-MINERALS) tablet, Take 1 tablet by mouth daily, Disp: , Rfl:     levothyroxine (SYNTHROID) 75 MCG tablet, Take 75 mcg by mouth Daily, Disp: , Rfl:     zolpidem (AMBIEN) 10 MG tablet, Take 5 mg by mouth nightly as needed for Sleep. (Patient not taking: Reported on 6/7/2022), Disp: , Rfl:   Patient has no known allergies. Social History     Tobacco Use    Smoking status: Current Some Day Smoker    Smokeless tobacco: Never Used    Tobacco comment: hookah socially   Substance Use Topics    Alcohol use: No    Drug use: No     Family History   Problem Relation Age of Onset    Other Father         LOW BLOOD SUGAR    Diabetes Paternal Grandfather     Lung Cancer Paternal Grandfather     Cancer Maternal Grandmother     Esophageal Cancer Maternal Aunt        Review of Systems   Constitutional: Negative. Negative for activity change and appetite change. HENT: Positive for congestion, ear pain (Fullness) and tinnitus. Negative for ear discharge, postnasal drip, rhinorrhea, sinus pressure and sinus pain. Eyes: Negative. Respiratory: Negative. Negative for shortness of breath and stridor. Cardiovascular: Negative.   Negative for chest pain and Ashley Hastings was seen today for other. Diagnoses and all orders for this visit:    Benign paroxysmal positional vertigo of left ear  -     DC CANALITH REPOSITIONING PROCEDURE, PER DAY    Dysfunction of left eustachian tube    Allergic rhinitis, unspecified seasonality, unspecified trigger    Other orders  -     azelastine (ASTELIN) 0.1 % nasal spray; 1-2 sprays by Nasal route 2 times daily as needed for Rhinitis Use in each nostril as directed  -     ondansetron (ZOFRAN-ODT) 4 MG disintegrating tablet; Take 1 tablet by mouth 3 times daily as needed for Nausea or Vomiting  -     diclofenac (VOLTAREN) 50 MG EC tablet; Take 1 tablet by mouth 2 times daily (with meals)    Hamilton-Hallpike maneuver is performed on the patient and found to be positive to the left. Epley maneuver performed x1 with resolution of her symptoms. She is given Reyes-Daroff exercises to be performed at home. Patient will resume Flonase, 2 sprays each nostril once daily and be placed on Astelin spray, 1 to 2 sprays each nostril twice daily as needed for congestion, rhinorrhea, postnasal drainage symptoms that she suffers from intermittently. She also be given prescription for diclofenac, 50 mg, 1 tablet twice daily as needed for ongoing TMJ discomfort. She is also given a prescription for Zofran, 4 mg 1 tablet 3 times daily as needed for nausea regarding her dizziness symptoms. Also recommend using warm compresses over the left TMJ when ear symptoms persist.  Patient states that she is flying to [place] in 1 day and it is recommended that she use Afrin spray, 2 sprays each nostril once 30 minutes prior to takeoff on her flights. She will follow-up in 1 month for reevaluation. She is instructed to call with any new or worsening symptoms prior to her next appointment.         Marleni Enrique, MSN, FNP-C  [facility], Nose and Thro[facility]    The information contained in this note has been dictated using drug and medical speech recognition software and may contain errors

## 2022-07-13 ENCOUNTER — OFFICE VISIT (OUTPATIENT)
Dept: ENT CLINIC | Age: 32
End: 2022-07-13
Payer: COMMERCIAL

## 2022-07-13 VITALS — HEIGHT: 64 IN | WEIGHT: 147 LBS | BODY MASS INDEX: 25.1 KG/M2

## 2022-07-13 DIAGNOSIS — H69.82 DYSFUNCTION OF LEFT EUSTACHIAN TUBE: ICD-10-CM

## 2022-07-13 DIAGNOSIS — J30.9 ALLERGIC RHINITIS, UNSPECIFIED SEASONALITY, UNSPECIFIED TRIGGER: ICD-10-CM

## 2022-07-13 DIAGNOSIS — H81.12 BENIGN PAROXYSMAL POSITIONAL VERTIGO OF LEFT EAR: Primary | ICD-10-CM

## 2022-07-13 PROCEDURE — 99213 OFFICE O/P EST LOW 20 MIN: CPT | Performed by: NURSE PRACTITIONER

## 2022-07-13 ASSESSMENT — ENCOUNTER SYMPTOMS
SHORTNESS OF BREATH: 0
COUGH: 0
BACK PAIN: 0
EYE PAIN: 0
RHINORRHEA: 1
VOMITING: 0
ALLERGIC/IMMUNOLOGIC NEGATIVE: 1
SINUS PRESSURE: 0
SORE THROAT: 0
EYE DISCHARGE: 0
DIARRHEA: 0

## 2022-07-13 NOTE — PROGRESS NOTES
activity: None   Other Topics Concern    None   Social History Narrative    None     Social Determinants of Health     Financial Resource Strain:     Difficulty of Paying Living Expenses: Not on file   Food Insecurity:     Worried About Running Out of Food in the Last Year: Not on file    Suni of Food in the Last Year: Not on file   Transportation Needs:     Lack of Transportation (Medical): Not on file    Lack of Transportation (Non-Medical): Not on file   Physical Activity:     Days of Exercise per Week: Not on file    Minutes of Exercise per Session: Not on file   Stress:     Feeling of Stress : Not on file   Social Connections:     Frequency of Communication with Friends and Family: Not on file    Frequency of Social Gatherings with Friends and Family: Not on file    Attends [employer] Services: Not on file    Active Member of [address] [place] [address] or Organizations: Not on file    Attends Club or Organization Meetings: Not on file    Marital Status: Not on file   Intimate Partner Violence:     Fear of Current or Ex-Partner: Not on file    Emotionally Abused: Not on file    Physically Abused: Not on file    Sexually Abused: Not on file   Housing Stability:     Unable to Pay for Housing in the Last Year: Not on file    Number of [place] in the Last Year: Not on file    Unstable Housing in the Last Year: Not on file     No Known Allergies          Review of Systems   Constitutional: Negative for chills and fever. HENT: Positive for congestion, ear pain (Left ear pressure), postnasal drip and rhinorrhea. Negative for ear discharge, nosebleeds, sinus pressure, sneezing, sore throat and tinnitus. Eyes: Negative for pain and discharge. Respiratory: Negative for cough and shortness of breath. Cardiovascular: Negative for chest pain. Gastrointestinal: Negative for diarrhea and vomiting. Genitourinary: Negative for flank pain. Musculoskeletal: Negative for back pain and neck pain.    Skin: Negative for rash. Allergic/Immunologic: Negative. Neurological: Negative for syncope and headaches. All other systems reviewed and are negative. Objective: There were no vitals filed for this visit. Physical Exam  Vitals reviewed. Constitutional:       Appearance: Normal appearance. HENT:      Head: Normocephalic and atraumatic. Jaw: There is normal jaw occlusion. No tenderness. Right Ear: Tympanic membrane, ear canal and external ear normal.      Left Ear: Tympanic membrane, ear canal and external ear normal.      Nose: Congestion present. Right Turbinates: Swollen and pale. Left Turbinates: Swollen and pale. Mouth/Throat:      Lips: Pink. Mouth: Mucous membranes are moist.      Pharynx: Oropharynx is clear. Eyes:      General: Lids are normal.      Conjunctiva/sclera: Conjunctivae normal.      Pupils: Pupils are equal, round, and reactive to light. Cardiovascular:      Rate and Rhythm: Normal rate and regular rhythm. Pulses: Normal pulses. Pulmonary:      Effort: Pulmonary effort is normal. No respiratory distress. Breath sounds: No stridor. Abdominal:      General: Abdomen is flat. Palpations: Abdomen is soft. Musculoskeletal:         General: Normal range of motion. Cervical back: Normal range of motion. No rigidity. Skin:     General: Skin is warm and dry. Neurological:      General: No focal deficit present. Mental Status: She is alert and oriented to person, place, and time. Psychiatric:         Attention and Perception: Attention normal.         Mood and Affect: Affect normal.         Behavior: Behavior normal. Behavior is cooperative. Thought Content: Thought content normal.         Judgment: Judgment normal.         Assessment:       Diagnosis Orders   1. Benign paroxysmal positional vertigo of left ear     2. Dysfunction of left eustachian tube     3.  Allergic rhinitis, unspecified seasonality, unspecified trigger              Plan:      Pt has seen symptom improvement of BPPV. Hamilton-Hallpike procedure was offered to the patient today however being asymptomatic for the past 2 weeks patient elected not to proceed at this time. Continues to have mild congestion and post nasal drainage. For this the patient will increase Flonase from 1 to 2 sprays in each  Nostril daily and increase Astelin from 1--2 sprays twice daily. In regards to her TMJ, the patient was instructed to continue use of diclofenac as well as warm compresses while following a soft diet. She was advised that it may be in her best interest to follow-up with a dentist, for treatment of bite guard. Tiara Young agrees to this plan and will follow-up in 3 months. She was instructed to call for any new or worsening symptoms prior to her next appointment. Neda Davenport, VICKI, FNP-C  [address] [place] [address], Nose and Throat    The information contained in this note has been dictated using drug and medical speech recognition software and may contain errors        SHERRI Abdalla CNP am scribing for and in the presence of Neda Davenport CNP 8:31 AM 07/13/22      Attestation:     Malathi Stroud CNP, personally performed the services described in this documentation as scribed by Daniel Abdalla CNP in my presence, and it is both accurate and complete.

## 2022-07-19 ENCOUNTER — PREP FOR PROCEDURE (OUTPATIENT)
Dept: SURGERY | Age: 32
End: 2022-07-19

## 2022-07-19 RX ORDER — SODIUM CHLORIDE, SODIUM LACTATE, POTASSIUM CHLORIDE, CALCIUM CHLORIDE 600; 310; 30; 20 MG/100ML; MG/100ML; MG/100ML; MG/100ML
INJECTION, SOLUTION INTRAVENOUS CONTINUOUS
Status: CANCELLED | OUTPATIENT
Start: 2022-07-19

## 2022-07-19 RX ORDER — SODIUM CHLORIDE 0.9 % (FLUSH) 0.9 %
5-40 SYRINGE (ML) INJECTION EVERY 12 HOURS SCHEDULED
Status: CANCELLED | OUTPATIENT
Start: 2022-07-19

## 2022-09-01 ENCOUNTER — HOSPITAL ENCOUNTER (OUTPATIENT)
Age: 32
Discharge: HOME | End: 2022-09-03
Payer: COMMERCIAL

## 2022-09-01 ENCOUNTER — HOSPITAL ENCOUNTER (OUTPATIENT)
Dept: GENERAL RADIOLOGY | Age: 32
Discharge: HOME | End: 2022-09-03
Payer: COMMERCIAL

## 2022-09-01 DIAGNOSIS — M54.2 CERVICALGIA: ICD-10-CM

## 2022-09-01 PROCEDURE — 72050 X-RAY EXAM NECK SPINE 4/5VWS: CPT

## 2022-11-22 ENCOUNTER — HOSPITAL ENCOUNTER (OUTPATIENT)
Dept: ULTRASOUND IMAGING | Age: 32
Discharge: HOME | End: 2022-11-24
Payer: COMMERCIAL

## 2022-11-22 DIAGNOSIS — E04.1 NON-TOXIC UNINODULAR GOITER: ICD-10-CM

## 2022-11-22 PROCEDURE — 76536 US EXAM OF HEAD AND NECK: CPT

## 2022-12-21 ENCOUNTER — HOSPITAL ENCOUNTER (OUTPATIENT)
Dept: ULTRASOUND IMAGING | Age: 32
Discharge: HOME | End: 2022-12-23
Payer: COMMERCIAL

## 2022-12-21 DIAGNOSIS — R10.9 STOMACH ACHE: ICD-10-CM

## 2022-12-21 PROCEDURE — 76705 ECHO EXAM OF ABDOMEN: CPT

## 2024-03-05 ENCOUNTER — TRANSCRIBE ORDERS (OUTPATIENT)
Dept: ADMINISTRATIVE | Age: 34
End: 2024-03-05

## 2024-03-05 DIAGNOSIS — D37.6 NEOPLASM OF UNCERTAIN BEHAVIOR OF LIVER, GALLBLADDER AND BILE DUCTS: Primary | ICD-10-CM

## 2024-03-13 ENCOUNTER — HOSPITAL ENCOUNTER (OUTPATIENT)
Dept: ULTRASOUND IMAGING | Age: 34
Discharge: HOME | End: 2024-03-15
Attending: FAMILY MEDICINE
Payer: COMMERCIAL

## 2024-03-13 DIAGNOSIS — R10.9 STOMACH ACHE: ICD-10-CM

## 2024-03-13 DIAGNOSIS — D37.6 NEOPLASM OF UNCERTAIN BEHAVIOR OF LIVER, GALLBLADDER AND BILE DUCTS: ICD-10-CM

## 2024-03-13 PROCEDURE — 76700 US EXAM ABDOM COMPLETE: CPT

## (undated) DEVICE — ELECTRODE PT RET AD L9FT HI MOIST COND ADH HYDRGEL CORDED

## (undated) DEVICE — GLOVE SURG SZ 75 CRM LTX FREE POLYISOPRENE POLYMER BEAD ANTI

## (undated) DEVICE — FORCEPS ADSON

## (undated) DEVICE — GAUZE,SPONGE,4"X4",8PLY,STRL,LF,10/TRAY: Brand: MEDLINE

## (undated) DEVICE — TAPE,WATERPROOF,CURAD,2"X10YD,LF,72/CS: Brand: CURAD

## (undated) DEVICE — CLAMP CRV FRENCH LONG

## (undated) DEVICE — DRAPE,CHEST,FENES,15X10,STERIL: Brand: MEDLINE

## (undated) DEVICE — DOUBLE BASIN SET: Brand: MEDLINE INDUSTRIES, INC.

## (undated) DEVICE — NEEDLE HYPO 25GA L1.5IN BLU POLYPR HUB S STL REG BVL STR

## (undated) DEVICE — GOWN,SIRUS,FABRNF,XL,20/CS: Brand: MEDLINE

## (undated) DEVICE — PACK PROCEDURE SURG GEN CUST

## (undated) DEVICE — JELLY,LUBE,STERILE,FLIP TOP,TUBE,2-OZ: Brand: MEDLINE

## (undated) DEVICE — READY WET SKIN SCRUB TRAY-LF: Brand: MEDLINE INDUSTRIES, INC.

## (undated) DEVICE — SWABSTICK SURG PREP BENZOIN TINCTURE SINGLE ST

## (undated) DEVICE — BLADE CLIPPER GEN PURP NS